# Patient Record
Sex: FEMALE | URBAN - METROPOLITAN AREA
[De-identification: names, ages, dates, MRNs, and addresses within clinical notes are randomized per-mention and may not be internally consistent; named-entity substitution may affect disease eponyms.]

---

## 2024-04-26 ENCOUNTER — OV NP (OUTPATIENT)
Dept: URBAN - METROPOLITAN AREA CLINIC 27 | Facility: CLINIC | Age: 49
End: 2024-04-26
Payer: MEDICAID

## 2024-04-26 VITALS
HEIGHT: 67 IN | DIASTOLIC BLOOD PRESSURE: 86 MMHG | SYSTOLIC BLOOD PRESSURE: 123 MMHG | RESPIRATION RATE: 17 BRPM | WEIGHT: 240 LBS | BODY MASS INDEX: 37.67 KG/M2 | HEART RATE: 88 BPM

## 2024-04-26 DIAGNOSIS — K92.1 MELENA: ICD-10-CM

## 2024-04-26 DIAGNOSIS — R13.19 ESOPHAGEAL DYSPHAGIA: ICD-10-CM

## 2024-04-26 DIAGNOSIS — K21.9 CHRONIC GERD: ICD-10-CM

## 2024-04-26 DIAGNOSIS — K27.9 PUD (PEPTIC ULCER DISEASE): ICD-10-CM

## 2024-04-26 PROBLEM — 235595009: Status: ACTIVE | Noted: 2024-04-26

## 2024-04-26 PROBLEM — 13200003: Status: ACTIVE | Noted: 2024-04-26

## 2024-04-26 PROCEDURE — 99245 OFF/OP CONSLTJ NEW/EST HI 55: CPT | Performed by: PHYSICIAN ASSISTANT

## 2024-04-26 PROCEDURE — 99205 OFFICE O/P NEW HI 60 MIN: CPT | Performed by: PHYSICIAN ASSISTANT

## 2024-04-26 RX ORDER — PANTOPRAZOLE SODIUM 40 MG/1
1 TABLET TABLET, DELAYED RELEASE ORAL ONCE A DAY
Qty: 30 | Refills: 3 | OUTPATIENT
Start: 2024-04-26

## 2024-04-26 RX ORDER — HYDROCORTISONE 25 MG/G
1 APPLICATION CREAM TOPICAL TWICE A DAY
Qty: 30 GRAM | Refills: 1 | OUTPATIENT
Start: 2024-04-26 | End: 2024-06-25

## 2024-04-26 NOTE — HPI-TODAY'S VISIT:
Ms. Nj is a 49-year-old female who was referred by the ER at [facility]. She was seen in the ER on 4/16/24 for black stools and rectal bleeding. Her Hgb was 11.3 which was a slight drop from her baseline 12.7. She was discharged home and advised to follow up with GI. Her PCP is at [place]. However, she does not know her PCP's name. On ROS, she reports intermittent dysphagia to solids and pills for greater than 10 years. It occurs most days. She is usually able to pass the food bolus with drinking large amounts of water. However, she has to vomit the food bolus 1-2 times per week. She has substernal burning that extends to her throat. She takes Omeprazole OTC q day. It helps but does not alleviate her heartburn. She does not follow an anti-reflux diet. Her last PO intake is 7 p.m. and in bed/reclined within 2 hours. She has intermittent nausea and vomiting that varies in frequency, but on average is several times a month. She is not able to identify a trigger. She's seen red blood on the toilet tissue, in the commode and on the surface of her stools x 1 month.  Her last colonoscopy was 2 years ago at [place]. She does not recall the findings or the recommended follow-up but states her memory is bad. She reports dark stools over the past 3 weeks. She does not take Pepto or iron. She does not have abd pain, diarrhea, constipation, or weight loss.   Mother with a history of stomach ulcer.

## 2025-07-17 ENCOUNTER — TELEPHONE ENCOUNTER (OUTPATIENT)
Dept: URBAN - METROPOLITAN AREA CLINIC 27 | Facility: CLINIC | Age: 50
End: 2025-07-17

## 2025-07-17 RX ORDER — HYDROCORTISONE 25 MG/G
1 APPLICATION CREAM TOPICAL TWICE A DAY
Qty: 30 GRAM | Refills: 1
Start: 2024-04-26 | End: 2025-09-15

## 2025-07-21 ENCOUNTER — TELEPHONE ENCOUNTER (OUTPATIENT)
Dept: URBAN - METROPOLITAN AREA CLINIC 27 | Facility: CLINIC | Age: 50
End: 2025-07-21

## 2025-07-30 ENCOUNTER — LAB OUTSIDE AN ENCOUNTER (OUTPATIENT)
Dept: URBAN - METROPOLITAN AREA CLINIC 27 | Facility: CLINIC | Age: 50
End: 2025-07-30

## 2025-07-30 ENCOUNTER — DASHBOARD ENCOUNTERS (OUTPATIENT)
Age: 50
End: 2025-07-30

## 2025-07-30 ENCOUNTER — OFFICE VISIT (OUTPATIENT)
Dept: URBAN - METROPOLITAN AREA CLINIC 27 | Facility: CLINIC | Age: 50
End: 2025-07-30
Payer: MEDICAID

## 2025-07-30 DIAGNOSIS — Z12.11 SCREEN FOR COLON CANCER: ICD-10-CM

## 2025-07-30 DIAGNOSIS — R13.19 ESOPHAGEAL DYSPHAGIA: ICD-10-CM

## 2025-07-30 DIAGNOSIS — R10.13 EPIGASTRIC PAIN: ICD-10-CM

## 2025-07-30 PROCEDURE — 99214 OFFICE O/P EST MOD 30 MIN: CPT | Performed by: INTERNAL MEDICINE

## 2025-07-30 RX ORDER — HYDROCORTISONE 25 MG/G
1 APPLICATION CREAM TOPICAL TWICE A DAY
Qty: 30 GRAM | Refills: 1 | Status: ACTIVE | COMMUNITY
Start: 2024-04-26 | End: 2025-09-15

## 2025-07-30 RX ORDER — PANTOPRAZOLE SODIUM 40 MG/1
1 TABLET TABLET, DELAYED RELEASE ORAL ONCE A DAY
Qty: 30 | Refills: 3 | Status: ACTIVE | COMMUNITY
Start: 2024-04-26

## 2025-07-30 NOTE — PHYSICAL EXAM GASTROINTESTINAL
Abdomen , soft, nontender, nondistended , no guarding or rigidity , no masses palpable , normal bowel sounds , Liver and Spleen,  no hepatosplenomegaly , liver nontender
,

## 2025-07-30 NOTE — HPI-TODAY'S VISIT:
50-year-old female here for epigastric discomfort, occasional black stool, dysphagia.  She was seen for similar symptoms last year, was put on Protonix which has helped somewhat with the abdominal discomfort, but continues to have some discomfort and intermittent black stool.  She has dysphagia intermittently to solid foods.  Last colonoscopy was 2022 at [place], unremarkable per patient, records not currently available.